# Patient Record
Sex: MALE | Race: WHITE | Employment: FULL TIME | URBAN - METROPOLITAN AREA
[De-identification: names, ages, dates, MRNs, and addresses within clinical notes are randomized per-mention and may not be internally consistent; named-entity substitution may affect disease eponyms.]

---

## 2019-06-25 ENCOUNTER — HOSPITAL ENCOUNTER (EMERGENCY)
Age: 39
Discharge: HOME | End: 2019-06-25
Payer: COMMERCIAL

## 2019-06-25 VITALS
RESPIRATION RATE: 16 BRPM | WEIGHT: 186 LBS | HEIGHT: 74 IN | TEMPERATURE: 98.2 F | HEART RATE: 87 BPM | BODY MASS INDEX: 23.87 KG/M2 | SYSTOLIC BLOOD PRESSURE: 122 MMHG | DIASTOLIC BLOOD PRESSURE: 82 MMHG | OXYGEN SATURATION: 95 %
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
## 2019-06-25 DIAGNOSIS — S81.811A: Primary | ICD-10-CM

## 2019-06-25 PROCEDURE — 90715 TDAP VACCINE 7 YRS/> IM: CPT | Performed by: NURSE PRACTITIONER

## 2019-06-25 PROCEDURE — 4500000021 HC ED LEVEL 1 PROCEDURE

## 2019-06-25 PROCEDURE — 99282 EMERGENCY DEPT VISIT SF MDM: CPT

## 2019-06-25 PROCEDURE — 90471 IMMUNIZATION ADMIN: CPT | Performed by: NURSE PRACTITIONER

## 2019-06-25 PROCEDURE — 6360000002 HC RX W HCPCS: Performed by: NURSE PRACTITIONER

## 2019-06-25 PROCEDURE — 4500000022 HC ED LEVEL 2 PROCEDURE

## 2019-06-25 RX ADMIN — TETANUS TOXOID, REDUCED DIPHTHERIA TOXOID AND ACELLULAR PERTUSSIS VACCINE, ADSORBED 0.5 ML: 5; 2.5; 8; 8; 2.5 SUSPENSION INTRAMUSCULAR at 10:59

## 2019-06-25 ASSESSMENT — ENCOUNTER SYMPTOMS
SHORTNESS OF BREATH: 0
ABDOMINAL PAIN: 0
DIARRHEA: 0
CHEST TIGHTNESS: 0
NAUSEA: 0
VOMITING: 0

## 2019-06-25 ASSESSMENT — PAIN SCALES - GENERAL: PAINLEVEL_OUTOF10: 4

## 2019-06-25 ASSESSMENT — PAIN DESCRIPTION - LOCATION: LOCATION: LEG

## 2019-06-25 ASSESSMENT — PAIN DESCRIPTION - DESCRIPTORS: DESCRIPTORS: ACHING

## 2019-06-25 ASSESSMENT — PAIN DESCRIPTION - PAIN TYPE: TYPE: ACUTE PAIN

## 2019-06-25 ASSESSMENT — PAIN DESCRIPTION - ORIENTATION: ORIENTATION: RIGHT

## 2019-06-25 NOTE — ED NOTES
Discharge instructions discussed with no questions or concerns. Pt. Carrie Olguin understanding to follow up with [facility]. Pt. Ambulatory upon discharge with no signs of distress noted.        Kirti Obrien RN  06/25/19 4170

## 2019-06-25 NOTE — ED PROVIDER NOTES
Skin: Positive for wound. All other systems reviewed and are negative. Positives and Pertinent negatives as per HPI. Except as noted abovein the ROS, all other systems were reviewed and negative. PAST MEDICAL HISTORY   History reviewed. No pertinent past medical history. SURGICAL HISTORY     Past Surgical History:   Procedure Laterality Date    CHOLECYSTECTOMY      GASTRIC BYPASS SURGERY      KNEE SURGERY Left     UPPER GASTROINTESTINAL ENDOSCOPY           CURRENTMEDICATIONS       There are no discharge medications for this patient. ALLERGIES     Penicillins    FAMILYHISTORY     History reviewed. No pertinent family history.        SOCIAL HISTORY       Social History     Socioeconomic History    Marital status: Single     Spouse name: None    Number of children: None    Years of education: None    Highest education level: None   Occupational History    None   Social Needs    Financial resource strain: None    Food insecurity:     Worry: None     Inability: None    Transportation needs:     Medical: None     Non-medical: None   Tobacco Use    Smoking status: Current Every Day Smoker     Types: E-Cigarettes    Smokeless tobacco: Never Used   Substance and Sexual Activity    Alcohol use: Not Currently    Drug use: Not Currently    Sexual activity: None   Lifestyle    Physical activity:     Days per week: None     Minutes per session: None    Stress: None   Relationships    Social connections:     Talks on phone: None     Gets together: None     Attends [employer] service: None     Active member of club or organization: None     Attends meetings of clubs or organizations: None     Relationship status: None    Intimate partner violence:     Fear of current or ex partner: None     Emotionally abused: None     Physically abused: None     Forced sexual activity: None   Other Topics Concern    None   Social History Narrative    None       SCREENINGS             PHYSICAL EXAM (up to 7 for level 4, 8 or more for level 5)     ED Triage Vitals [06/25/19 1018]   BP Temp Temp Source Pulse Resp SpO2 Height Weight   122/82 98.2 °F (36.8 °C) Infrared 87 16 95 % 6' 2\" (1.88 m) 186 lb (84.4 kg)       Physical Exam   Constitutional: He is oriented to person, place, and time. He appears well-developed and well-nourished. No distress. HENT:   Head: Normocephalic and atraumatic. Right Ear: External ear normal.   Left Ear: External ear normal.   Eyes: Right eye exhibits no discharge. Left eye exhibits no discharge. Neck: Normal range of motion. Neck supple. No JVD present. Cardiovascular: Normal rate and regular rhythm. Exam reveals no friction rub. No murmur heard. Pulmonary/Chest: Effort normal and breath sounds normal. No stridor. No respiratory distress. He has no wheezes. Musculoskeletal: Normal range of motion. Neurological: He is alert and oriented to person, place, and time. Skin: Skin is warm and dry. He is not diaphoretic. No pallor. Psychiatric: He has a normal mood and affect. His behavior is normal.   Nursing note and vitals reviewed. DIAGNOSTIC RESULTS   LABS:    Labs Reviewed - No data to display    All other labs were within normal range or not returned as of this dictation. EKG: All EKG's are interpreted by the Emergency Department Physician in the absence of a cardiologist.  Please see their note for interpretation of EKG. RADIOLOGY:   Non-plain film images such as CT, Ultrasound and MRI are read by the radiologist. Plain radiographic images are visualized andpreliminarily interpreted by the  ED Provider with the below findings:        Interpretation [facility] Radiologist below, if available at the time of this note:    No orders to display     No results found.         PROCEDURES   Unless otherwise noted below, none     Lac Repair  Date/Time: 6/25/2019 4:09 PM  Performed by: TAMMY Rodrigues CNP  Authorized by: TAMMY Rodrigues CNP Consent:     Consent obtained:  Verbal    Consent given by:  Patient    Risks discussed:  Infection, pain, retained foreign body, tendon damage, vascular damage, poor wound healing, poor cosmetic result, need for additional repair and nerve damage  Anesthesia (see MAR for exact dosages): Anesthesia method:  Local infiltration    Local anesthetic:  Lidocaine 1% WITH epi  Laceration details:     Location:  Leg    Leg location:  R lower leg    Length (cm):  10  Repair type:     Repair type:  Complex  Exploration:     Contaminated: no    Treatment:     Area cleansed with:  Hibiclens    Amount of cleaning:  Extensive    Irrigation method:  Pressure wash  Skin repair:     Repair method:  Sutures    Suture size:  4-0    Suture material:  Nylon    Number of sutures:  15  Approximation:     Approximation:  Close    Vermilion border: well-aligned    Post-procedure details:     Dressing:  Antibiotic ointment and bulky dressing    Patient tolerance of procedure: Tolerated well, no immediate complications        CRITICAL CARE TIME   N/A    CONSULTS:  None      EMERGENCY DEPARTMENT COURSE and DIFFERENTIAL DIAGNOSIS/MDM:   Vitals:    Vitals:    06/25/19 1018   BP: 122/82   Pulse: 87   Resp: 16   Temp: 98.2 °F (36.8 °C)   TempSrc: Infrared   SpO2: 95%   Weight: 186 lb (84.4 kg)   Height: 6' 2\" (1.88 m)       Patient was given thefollowing medications:  Medications   Tetanus-Diphth-Acell Pertussis (BOOSTRIX) injection 0.5 mL (0.5 mLs Intramuscular Given 6/25/19 1059)       Briefly, this is a 41year old male that presents the emergency department today with a Worker's Comp. injury from urgent care. The urgent care felt that they were unqualified to suture his leg laceration. Patient does have a right lower leg laceration and a V-shaped caused at work. Patient reports some dull pain without mitigating exacerbating factors. Tetanus is not up-to-date. Sutures placed as directed above.     Follow-up with occupational medicine for suture removal in 1 week. Return for new or worsening symptoms. I estimate there is LOW risk for COMPARTMENT SYNDROME, TENDON OR NEUROVASCULAR INJURY, FOREIGN BODY OR signs of INFECTION thus I consider the discharge disposition reasonable. FINAL IMPRESSION      1. Laceration of right lower leg without foreign body, initial encounter    2. Work related injury          DISPOSITION/PLAN   DISPOSITION Decision To Discharge 06/25/2019 11:30:09 AM      PATIENT REFERREDTO:  Betty [address] [place] [address]    Schedule an appointment as soon as possible for a visit in 1 week  For suture removal      DISCHARGE MEDICATIONS:  There are no discharge medications for this patient. DISCONTINUED MEDICATIONS:  There are no discharge medications for this patient.              (Please note that portions ofthis note were completed with a voice recognition program.  Efforts were made to edit the dictations but occasionally words are mis-transcribed.)    TAMMY Alejandro - CNP (electronically signed)           TAMMY Alejandro CNP  06/25/19 2578

## 2019-06-25 NOTE — ED NOTES
Called and spoke with Fred from [facility] and he states spectrum is not on mandatory screening list.     Alicia Shearer RN  06/25/19 9528

## 2019-06-27 ENCOUNTER — HOSPITAL ENCOUNTER (EMERGENCY)
Age: 39
Discharge: HOME | End: 2019-06-27
Payer: COMMERCIAL

## 2019-06-27 VITALS
OXYGEN SATURATION: 98 % | TEMPERATURE: 98.2 F | HEART RATE: 69 BPM | DIASTOLIC BLOOD PRESSURE: 73 MMHG | BODY MASS INDEX: 24.13 KG/M2 | HEIGHT: 74 IN | RESPIRATION RATE: 15 BRPM | SYSTOLIC BLOOD PRESSURE: 116 MMHG | WEIGHT: 188 LBS

## 2019-06-27 DIAGNOSIS — Z51.89 VISIT FOR WOUND CHECK: Primary | ICD-10-CM

## 2019-06-27 PROCEDURE — 99282 EMERGENCY DEPT VISIT SF MDM: CPT

## 2019-06-27 RX ORDER — ACETAMINOPHEN 325 MG/1
650 TABLET ORAL EVERY 6 HOURS PRN
COMMUNITY

## 2019-06-27 ASSESSMENT — ENCOUNTER SYMPTOMS
VOMITING: 0
BACK PAIN: 0
NAUSEA: 0
CONSTIPATION: 0
SHORTNESS OF BREATH: 0
DIARRHEA: 0
ABDOMINAL PAIN: 0
COUGH: 0
COLOR CHANGE: 0

## 2019-06-27 ASSESSMENT — PAIN DESCRIPTION - LOCATION: LOCATION: LEG

## 2019-06-27 ASSESSMENT — PAIN SCALES - GENERAL: PAINLEVEL_OUTOF10: 4

## 2019-06-27 ASSESSMENT — PAIN DESCRIPTION - ORIENTATION: ORIENTATION: RIGHT

## 2019-06-27 NOTE — ED PROVIDER NOTES
**EVALUATED BY JOHN PAUL**    [address] [facility]  eMERGENCY dEPARTMENT eNCOUnter    Pt Name: Remi Freeman  MRN: [id number]  [place] 1980  Date of evaluation: 6/27/2019  Provider: DEYVI Marley    Chief Complaint:    Chief Complaint   Patient presents with    Wound Check     patient reports getting suctures Tuesday to his right lower leg, he is here worried about infection. Nursing Notes, Past Medical Hx, Past Surgical Hx, Social Hx, Allergies, and Family Hx were all reviewed and agreedwith or any disagreements were addressed in the HPI.    HPI:  (Location, Duration, Timing, Severity, Quality, Assoc Sx, Context, Modifying factors)  This is a  41 y.o. male who presents to the emergency department with complaints of wanting a wound check, had sutures placed here this past Tuesday, and was concerned about possible infection. He states that this morning he woke up with a temperature of 100.5. Took Tylenol. Here as he is afebrile. Denies any swelling, erythema or warmth to the area of sutures. No drainage of pus. He states that the lower extremities are always slightly red in color and this is not new. He denies any discomfort. No cough, congestion, urinary symptoms, vomiting or diarrhea. All other systems were reviewed and are negative. Past Medical/Surgical History:  History reviewed. No pertinent past medical history. Procedure Laterality Date    CHOLECYSTECTOMY      GASTRIC BYPASS SURGERY      KNEE SURGERY Left     UPPER GASTROINTESTINAL ENDOSCOPY         Medications:  Discharge Medication List as of 6/27/2019  2:37 PM      CONTINUE these medications which have NOT CHANGED    Details   acetaminophen (TYLENOL) 325 MG tablet Take 650 mg by mouth every 6 hours as needed for PainHistorical Med               Review of Systems:  Review of Systems   Constitutional: Negative for chills, fatigue and fever. Respiratory: Negative for cough and shortness of breath. Cardiovascular: Negative for chest pain. Gastrointestinal: Negative for abdominal pain, constipation, diarrhea, nausea and vomiting. Musculoskeletal: Negative for back pain. Skin: Negative for color change, rash and wound. Neurological: Negative for weakness and numbness. All other systems reviewed and are negative. Positives and Pertinent negatives as per HPI. Except as noted above in the ROS, all other systems were reviewed/completed and are negative. Physical Exam:  Physical Exam   Constitutional: He is oriented to person, place, and time. He appears well-developed and well-nourished. He is active and cooperative. Non-toxic appearance. HENT:   Head: Normocephalic. Right Ear: External ear normal.   Left Ear: External ear normal.   Nose: Nose normal.   Eyes: Conjunctivae are normal. Right eye exhibits no discharge. Left eye exhibits no discharge. Neck: Normal range of motion. Neck supple. Cardiovascular: Normal rate, regular rhythm and normal heart sounds. Exam reveals no gallop and no friction rub. No murmur heard. Pulmonary/Chest: Effort normal and breath sounds normal. No respiratory distress. He has no wheezes. He has no rales. Musculoskeletal: Normal range of motion. He exhibits no edema or tenderness. Neurological: He is alert and oriented to person, place, and time. Skin: Skin is warm and dry. He is not diaphoretic. No pallor. Psychiatric: He has a normal mood and affect. His behavior is normal.   Nursing note and vitals reviewed. AN IMAGE HAS BEEN TAKEN OF THIS PATIENT'S DISEASE PROCESS AND PLACED UNDER THE MEDIA TAB (Labeled clinical unknown)  Patient has given me verbal permission to take and document this image.       MEDICAL DECISION MAKING    Vitals:    Vitals:    06/27/19 1404   BP: 116/73   Pulse: 69   Resp: 15   Temp: 98.2 °F (36.8 °C)   TempSrc: Oral   SpO2: 98%   Weight: 188 lb (85.3 kg)   Height: 6' 2\" (1.88 m)       LABS: Labs Reviewed - No data to display     Remainder of labs reviewed and were negative at this time or not returned at the time of this note. RADIOLOGY:   Non-plain film images suchas CT, Ultrasound and MRI are read by the radiologist. I, DEYVI Abebe have directly visualized the radiologic plain film image(s) with the below findings:  Interpretation per the Radiologist below, if available at the time of this note:    No orders to display        No results found. MEDICAL DECISION MAKING / ED COURSE:      PROCEDURES:   Procedures    Patient was given:  Medications - No data to display  Patient presents to the emergency department with need for wound check as he had a temperature this morning. He is afebrile here is no signs of infection. Wound does appear normal in color without erythema, warmth, drainage or induration. Discharged home in stable condition. The patient tolerated their visit well. I have evaluated the patient with physician available for consultation as needed. I have discussed the findings of today's workup with the patient and addressed the patient's questions and concerns. Important warning signs as well as new or worsening symptoms which wouldnecessitate immediate return to the ED were discussed. The plan is to discharge from the ED at this time, and the patient is in stable condition. The patient acknowledged understanding is agreeable with this plan. CLINICAL IMPRESSION:  1.  Visit for wound check        DISPOSITION Decision To Discharge 06/27/2019 02:30:17 PM      PATIENT REFERRED TO:  Your primary care physician    Schedule an appointment as soon as possible for a visit         DISCHARGE MEDICATIONS:  Discharge Medication List as of 6/27/2019  2:37 PM                 (Please note the MDM and HPI sections of this note were completed with avoice recognition program.  Efforts were made to edit the dictations but occasionally words are mis-transcribed.)    Electronically signed, DEYVI Abebe,